# Patient Record
Sex: FEMALE | Race: WHITE | Employment: FULL TIME | URBAN - METROPOLITAN AREA
[De-identification: names, ages, dates, MRNs, and addresses within clinical notes are randomized per-mention and may not be internally consistent; named-entity substitution may affect disease eponyms.]

---

## 2017-01-16 PROCEDURE — 82784 ASSAY IGA/IGD/IGG/IGM EACH: CPT | Performed by: INTERNAL MEDICINE

## 2017-01-16 PROCEDURE — 86255 FLUORESCENT ANTIBODY SCREEN: CPT | Performed by: INTERNAL MEDICINE

## 2017-01-16 PROCEDURE — 83516 IMMUNOASSAY NONANTIBODY: CPT | Performed by: INTERNAL MEDICINE

## 2017-08-02 PROCEDURE — 87624 HPV HI-RISK TYP POOLED RSLT: CPT | Performed by: OBSTETRICS & GYNECOLOGY

## 2017-08-02 PROCEDURE — 88175 CYTOPATH C/V AUTO FLUID REDO: CPT | Performed by: OBSTETRICS & GYNECOLOGY

## 2017-09-05 PROBLEM — J45.20 MILD INTERMITTENT ASTHMA WITHOUT COMPLICATION: Status: ACTIVE | Noted: 2017-09-05

## 2017-09-05 PROBLEM — J45.20 MILD INTERMITTENT ASTHMA WITHOUT COMPLICATION (HCC): Status: ACTIVE | Noted: 2017-09-05

## 2018-01-19 PROCEDURE — 87086 URINE CULTURE/COLONY COUNT: CPT | Performed by: PHYSICIAN ASSISTANT

## 2018-01-19 PROCEDURE — 87077 CULTURE AEROBIC IDENTIFY: CPT | Performed by: PHYSICIAN ASSISTANT

## 2018-09-06 PROBLEM — K85.00 IDIOPATHIC ACUTE PANCREATITIS WITHOUT INFECTION OR NECROSIS (HCC): Status: ACTIVE | Noted: 2018-09-06

## 2018-09-06 PROBLEM — K85.00 IDIOPATHIC ACUTE PANCREATITIS WITHOUT INFECTION OR NECROSIS: Status: ACTIVE | Noted: 2018-09-06

## 2018-09-06 PROCEDURE — 88175 CYTOPATH C/V AUTO FLUID REDO: CPT | Performed by: OBSTETRICS & GYNECOLOGY

## 2018-09-06 PROCEDURE — 87624 HPV HI-RISK TYP POOLED RSLT: CPT | Performed by: OBSTETRICS & GYNECOLOGY

## 2019-05-07 PROBLEM — K85.00 IDIOPATHIC ACUTE PANCREATITIS WITHOUT INFECTION OR NECROSIS (HCC): Status: RESOLVED | Noted: 2018-09-06 | Resolved: 2019-05-07

## 2019-05-07 PROBLEM — K85.00 IDIOPATHIC ACUTE PANCREATITIS WITHOUT INFECTION OR NECROSIS: Status: RESOLVED | Noted: 2018-09-06 | Resolved: 2019-05-07

## 2019-05-07 PROBLEM — K86.1 AUTOIMMUNE PANCREATITIS (HCC): Status: ACTIVE | Noted: 2019-05-07

## 2019-06-12 ENCOUNTER — HOSPITAL ENCOUNTER (OUTPATIENT)
Age: 49
Discharge: HOME | End: 2019-06-12
Attending: FAMILY MEDICINE
Payer: COMMERCIAL

## 2019-06-12 VITALS
TEMPERATURE: 99 F | SYSTOLIC BLOOD PRESSURE: 122 MMHG | DIASTOLIC BLOOD PRESSURE: 60 MMHG | RESPIRATION RATE: 18 BRPM | WEIGHT: 150 LBS | HEART RATE: 72 BPM | OXYGEN SATURATION: 98 % | BODY MASS INDEX: 27 KG/M2

## 2019-06-12 DIAGNOSIS — L02.416 CUTANEOUS ABSCESS OF LEFT LOWER EXTREMITY: Primary | ICD-10-CM

## 2019-06-12 PROCEDURE — 99213 OFFICE O/P EST LOW 20 MIN: CPT

## 2019-06-12 PROCEDURE — 99203 OFFICE O/P NEW LOW 30 MIN: CPT

## 2019-06-12 RX ORDER — SULFAMETHOXAZOLE AND TRIMETHOPRIM 800; 160 MG/1; MG/1
1 TABLET ORAL 2 TIMES DAILY
Qty: 14 TABLET | Refills: 0 | Status: SHIPPED | OUTPATIENT
Start: 2019-06-12 | End: 2019-06-19

## 2019-06-12 NOTE — ED PROVIDER NOTES
Patient Seen in: [facility]    History   Patient presents with:  Abscess (integumentary)    Stated Complaint: lump by  groin area    HPI    Patient is here with a small boil over the left medial upper thigh.   For the past Temp 98.6 °F (37 °C) (Oral)   Resp 18   Wt 68 kg   LMP 05/01/2019 (Exact Date)   SpO2 98%   BMI 27.44 kg/m²         Physical Exam   Constitutional: She is oriented to person, place, and time. She appears well-developed and well-nourished. No distress.    H

## 2019-06-15 ENCOUNTER — HOSPITAL ENCOUNTER (OUTPATIENT)
Age: 49
Discharge: HOME | End: 2019-06-15
Payer: COMMERCIAL

## 2019-06-15 VITALS
HEIGHT: 66 IN | DIASTOLIC BLOOD PRESSURE: 67 MMHG | TEMPERATURE: 99 F | RESPIRATION RATE: 20 BRPM | BODY MASS INDEX: 24.11 KG/M2 | HEART RATE: 78 BPM | WEIGHT: 150 LBS | OXYGEN SATURATION: 100 % | SYSTOLIC BLOOD PRESSURE: 119 MMHG

## 2019-06-15 DIAGNOSIS — T78.40XA ALLERGIC REACTION, INITIAL ENCOUNTER: ICD-10-CM

## 2019-06-15 DIAGNOSIS — L02.91 ABSCESS: Primary | ICD-10-CM

## 2019-06-15 PROCEDURE — 99214 OFFICE O/P EST MOD 30 MIN: CPT

## 2019-06-15 PROCEDURE — 99213 OFFICE O/P EST LOW 20 MIN: CPT

## 2019-06-15 RX ORDER — CEPHALEXIN 500 MG/1
500 CAPSULE ORAL 3 TIMES DAILY
Qty: 21 CAPSULE | Refills: 0 | Status: SHIPPED | OUTPATIENT
Start: 2019-06-15 | End: 2019-06-21

## 2019-06-15 NOTE — ED INITIAL ASSESSMENT (HPI)
Seen here 3 days ago and started on bactrim to treat an abscess. Started with hives yesterday, worse today. C/o itching. No facial swelling or SOB.

## 2019-06-15 NOTE — ED PROVIDER NOTES
Patient presents with:  Rash Skin Problem (integumentary)      HPI:     Festus Foy is a 47year old female who presents today with a chief complaint of a rash that started yesterday.   The patient states she was on day 3 of taking Bactrim for a small on file        Gets together: Not on file        Attends Church service: Not on file        Active member of club or organization: Not on file        Attends meetings of clubs or organizations: Not on file        Relationship status: Not on file      In Encounter      cephALEXin 500 MG Oral Cap          Sig: Take 1 capsule (500 mg total) by mouth 3 (three) times daily for 7 days.           Dispense:  21 capsule          Refill:  0      Labs performed this visit:  No results found for this or any previous v

## 2020-11-09 ENCOUNTER — APPOINTMENT (OUTPATIENT)
Dept: ULTRASOUND IMAGING | Age: 50
End: 2020-11-09
Attending: EMERGENCY MEDICINE
Payer: COMMERCIAL

## 2020-11-09 ENCOUNTER — HOSPITAL ENCOUNTER (OUTPATIENT)
Age: 50
Discharge: HOME | End: 2020-11-09
Attending: EMERGENCY MEDICINE
Payer: COMMERCIAL

## 2020-11-09 VITALS
TEMPERATURE: 98 F | HEART RATE: 66 BPM | SYSTOLIC BLOOD PRESSURE: 133 MMHG | OXYGEN SATURATION: 100 % | DIASTOLIC BLOOD PRESSURE: 74 MMHG | RESPIRATION RATE: 18 BRPM

## 2020-11-09 DIAGNOSIS — T14.8XXA BLOOD BLISTER: ICD-10-CM

## 2020-11-09 DIAGNOSIS — S80.11XA CONTUSION OF RIGHT LOWER EXTREMITY, INITIAL ENCOUNTER: Primary | ICD-10-CM

## 2020-11-09 PROCEDURE — 99214 OFFICE O/P EST MOD 30 MIN: CPT

## 2020-11-09 PROCEDURE — 93971 EXTREMITY STUDY: CPT | Performed by: EMERGENCY MEDICINE

## 2020-11-09 NOTE — ED INITIAL ASSESSMENT (HPI)
PATIENT ARRIVED AMBULATORY TO ROOM. PATIENT STATES SHE INJURED HER RIGHT SHIN ON A SHOPPING CART 1 WEEK AGO. +SWELLING AND REDNESS TO THE AREA.  PATIENT STATES \"IT STILL HURTS AND ITS GETTING RED\"

## 2020-11-10 NOTE — ED PROVIDER NOTES
Patient Seen in: [facility]      History   Patient presents with:  Leg Pain    Stated Complaint: right leg pain swollen     HPI    44 yo female banged her right shin on a shopping cart about one week ago.  She c/o pain and a blister to the leslie Constitutional:       Appearance: Normal appearance. She is well-developed. HENT:      Head: Normocephalic and atraumatic. Eyes:      Conjunctiva/sclera: Conjunctivae normal.      Pupils: Pupils are equal, round, and reactive to light.    Neck:      M

## 2021-03-02 PROBLEM — Z86.16 HISTORY OF COVID-19: Status: ACTIVE | Noted: 2021-03-02

## 2021-03-02 PROBLEM — R94.31 PROLONGED Q-T INTERVAL ON ECG: Status: ACTIVE | Noted: 2021-03-02

## 2021-07-12 PROBLEM — R94.31 PROLONGED Q-T INTERVAL ON ECG: Status: RESOLVED | Noted: 2021-03-02 | Resolved: 2021-07-12

## 2022-02-01 PROBLEM — Z87.19 HISTORY OF PANCREATITIS: Status: ACTIVE | Noted: 2022-02-01

## 2024-03-02 ENCOUNTER — HOSPITAL ENCOUNTER (OUTPATIENT)
Age: 54
Discharge: HOME | End: 2024-03-02
Payer: COMMERCIAL

## 2024-03-02 VITALS
RESPIRATION RATE: 20 BRPM | HEART RATE: 96 BPM | DIASTOLIC BLOOD PRESSURE: 83 MMHG | OXYGEN SATURATION: 99 % | TEMPERATURE: 97 F | SYSTOLIC BLOOD PRESSURE: 136 MMHG

## 2024-03-02 DIAGNOSIS — N39.0 ACUTE UTI: Primary | ICD-10-CM

## 2024-03-02 LAB
CLARITY UR: CLEAR
COLOR UR: YELLOW
GLUCOSE UR STRIP-MCNC: NEGATIVE MG/DL
KETONES UR STRIP-MCNC: NEGATIVE MG/DL
NITRITE UR QL STRIP: NEGATIVE
PH UR STRIP: 7 [PH]
PROT UR STRIP-MCNC: 100 MG/DL
SP GR UR STRIP: 1.02
UROBILINOGEN UR STRIP-ACNC: 2 MG/DL

## 2024-03-02 PROCEDURE — 99204 OFFICE O/P NEW MOD 45 MIN: CPT

## 2024-03-02 PROCEDURE — 87086 URINE CULTURE/COLONY COUNT: CPT | Performed by: NURSE PRACTITIONER

## 2024-03-02 PROCEDURE — 81002 URINALYSIS NONAUTO W/O SCOPE: CPT

## 2024-03-02 RX ORDER — CEPHALEXIN 500 MG/1
500 CAPSULE ORAL 2 TIMES DAILY
Qty: 14 CAPSULE | Refills: 0 | Status: SHIPPED | OUTPATIENT
Start: 2024-03-02 | End: 2024-03-09

## 2024-03-02 NOTE — ED INITIAL ASSESSMENT (HPI)
Urinary symptoms since yesterday, no fever, no flank or back pain, no unusual vaginal bleeding or discharge

## 2024-03-02 NOTE — DISCHARGE INSTRUCTIONS
Push fluids. Rest. Take the antibiotics as prescribed. A urine culture is pending. Follow up as needed with your doctor. Return for any concerns.

## 2024-03-02 NOTE — ED PROVIDER NOTES
Patient Seen in: [facility]      History     Chief Complaint   Patient presents with    Urinary Symptoms     Stated Complaint: UTI  Subjective:   53-year-old female presents for dysuria, urgency, and frequency that started yesterday.  No beth hematuria.  No pelvic or abdominal pain.  No flank pain.  No fevers or chills.  No nausea or vomiting.  No vaginal concerns or risk for STDs.  She has had UTIs in the past.  She does have a history of kidney stones, but states this does not feel like a kidney stone.  No history of pyelonephritis.  She appears nontoxic.      Objective:   Past Medical History:   Diagnosis Date    Asthma (HCC)     + methacholine challenge test per patient    IBS (irritable bowel syndrome)     Kidney stone     MELINDA (obstructive sleep apnea) 6/29/21 HST    AHI 6 Supine AHI 13 non-supine AHI 3     Rheumatic fever in pediatric patient     Rosacea             Past Surgical History:   Procedure Laterality Date    COLONOSCOPY  2010    diverticulosis    COLONOSCOPY  12/16    normal- fair prep- repeat 5 yrs (MAG citrate+ prep)    COLONOSCOPY  08/2019    normal- rpeeat 10 yrs    OTHER  2010    R ovary & fallopian tube removed     OTHER  1988    cyst on L hand removed    OTHER SURGICAL HISTORY  2010    R oophorectomy    OTHER SURGICAL HISTORY      laparoscopy no findings    PELVIS LAPAROSCOPY,DX  June 2014              Social History     Socioeconomic History    Marital status:    Tobacco Use    Smoking status: Never    Smokeless tobacco: Never   Vaping Use    Vaping Use: Never used   Substance and Sexual Activity    Alcohol use: Yes     Alcohol/week: 0.0 standard drinks of alcohol    Drug use: No            Review of Systems   Genitourinary:  Positive for dysuria, frequency and urgency.   All other systems reviewed and are negative.      Positive for stated complaint: UTI  Other systems are as noted in HPI.  Constitutional and vital signs reviewed.      All other systems reviewed and  negative except as noted above.    Physical Exam     ED Triage Vitals [03/02/24 0809]   /83   Pulse 96   Resp 20   Temp 97 °F (36.1 °C)   Temp src Temporal   SpO2 99 %   O2 Device None (Room air)     Current:/83   Pulse 96   Temp 97 °F (36.1 °C) (Temporal)   Resp 20   LMP 09/02/2019   SpO2 99%     Physical Exam  Vitals and nursing note reviewed.   Constitutional:       General: She is not in acute distress.     Appearance: Normal appearance. She is not toxic-appearing.   Cardiovascular:      Rate and Rhythm: Normal rate and regular rhythm.   Pulmonary:      Effort: Pulmonary effort is normal.      Breath sounds: Normal breath sounds.   Abdominal:      Palpations: Abdomen is soft.      Tenderness: There is no abdominal tenderness. There is no right CVA tenderness or left CVA tenderness.   Musculoskeletal:         General: Normal range of motion.      Cervical back: Normal range of motion.   Skin:     General: Skin is warm and dry.      Capillary Refill: Capillary refill takes less than 2 seconds.   Neurological:      General: No focal deficit present.      Mental Status: She is alert and oriented to person, place, and time.   Psychiatric:         Mood and Affect: Mood normal.         Behavior: Behavior normal.         ED Course     Labs Reviewed   [facility] POCT URINALYSIS DIPSTICK - Abnormal; Notable for the following components:       Result Value    Protein urine 100 (*)     Bilirubin, Urine Small (*)     Blood, Urine Large (*)     Urobilinogen urine 2.0 (*)     Leukocyte esterase urine Large (*)     All other components within normal limits   URINE CULTURE, ROUTINE         MDM       Medical Decision Making  I will treat the UTI with Keflex.  We discussed pushing fluids.  She is aware culture is pending.  She will follow-up as needed with her primary care doctor.    Amount and/or Complexity of Data Reviewed  Labs: ordered.     Details: The urine dip shows large leukocytes, large blood, small bilirubin,  and 100 of protein.  A urine culture is pending.    Risk  Prescription drug management.  Risk Details: UTI versus pyelonephritis versus kidney stone        Disposition and Plan     Clinical Impression:  1. Acute UTI         Disposition:  Discharge  3/2/2024  8:24 am    Follow-up:  Enrique Griffith MD  [address] [address]  [phone]    Schedule an appointment as soon as possible for a visit   As needed          Medications Prescribed:  Discharge Medication List as of 3/2/2024  8:24 AM        START taking these medications    Details   cephalexin 500 MG Oral Cap Take 1 capsule (500 mg total) by mouth 2 (two) times daily for 7 days., Print, Disp-14 capsule, R-0

## 2025-01-06 ENCOUNTER — HOSPITAL ENCOUNTER (OUTPATIENT)
Age: 55
Discharge: HOME | End: 2025-01-06
Payer: COMMERCIAL

## 2025-01-06 VITALS
TEMPERATURE: 98 F | HEART RATE: 98 BPM | RESPIRATION RATE: 20 BRPM | DIASTOLIC BLOOD PRESSURE: 73 MMHG | SYSTOLIC BLOOD PRESSURE: 108 MMHG | OXYGEN SATURATION: 99 %

## 2025-01-06 DIAGNOSIS — J45.21 MILD INTERMITTENT ASTHMA WITH EXACERBATION (HCC): Primary | ICD-10-CM

## 2025-01-06 PROCEDURE — 99214 OFFICE O/P EST MOD 30 MIN: CPT

## 2025-01-06 PROCEDURE — 94640 AIRWAY INHALATION TREATMENT: CPT

## 2025-01-06 RX ORDER — AZITHROMYCIN 250 MG/1
TABLET, FILM COATED ORAL
Qty: 6 TABLET | Refills: 0 | Status: SHIPPED | OUTPATIENT
Start: 2025-01-06 | End: 2025-01-11

## 2025-01-06 RX ORDER — IPRATROPIUM BROMIDE AND ALBUTEROL SULFATE 2.5; .5 MG/3ML; MG/3ML
3 SOLUTION RESPIRATORY (INHALATION) ONCE
Status: COMPLETED | OUTPATIENT
Start: 2025-01-06 | End: 2025-01-06

## 2025-01-06 RX ORDER — METHYLPREDNISOLONE 4 MG/1
TABLET ORAL
Qty: 1 EACH | Refills: 0 | Status: SHIPPED | OUTPATIENT
Start: 2025-01-06

## 2025-01-06 NOTE — ED PROVIDER NOTES
Patient Seen in: [facility]      History     Chief Complaint   Patient presents with    Cough     Most likely Bronchitis (have had before feels the same  No voice - Entered by patient     Stated Complaint: Cough - Most likely Bronchitis (have had before feels the same  No voice    Subjective:   HPI    54-year-old female with history of asthma, MELINDA presents for evaluation of cough, chest congestion and voice hoarseness.  Patient reports symptoms for the last 3 days.  She has been using her albuterol inhaler and Singulair, Advair but continues to have some shortness of breath.  She states it feels like previous episodes of bronchitis flares.    Objective:     Past Medical History:    Asthma (HCC)    + methacholine challenge test per patient    IBS (irritable bowel syndrome)    Kidney stone    MELINDA (obstructive sleep apnea)    AHI 6 Supine AHI 13 non-supine AHI 3     Rheumatic fever in pediatric patient    Rosacea              Past Surgical History:   Procedure Laterality Date    Colonoscopy  2010    diverticulosis    Colonoscopy  12/16    normal- fair prep- repeat 5 yrs (MAG citrate+ prep)    Colonoscopy  08/2019    normal- rpeeat 10 yrs    Other  2010    R ovary & fallopian tube removed     Other  1988    cyst on L hand removed    Other surgical history  2010    R oophorectomy    Other surgical history      laparoscopy no findings    Pelvis laparoscopy,dx  June 2014                No pertinent social history.            Review of Systems    Positive for stated complaint: Cough - Most likely Bronchitis (have had before feels the same  No voice  Other systems are as noted in HPI.  Constitutional and vital signs reviewed.      All other systems reviewed and negative except as noted above.    Physical Exam     ED Triage Vitals [01/06/25 0958]   /73   Pulse 118   Resp 20   Temp 98.2 °F (36.8 °C)   Temp src Temporal   SpO2 99 %   O2 Device None (Room air)       Current Vitals:   Vital Signs  BP:  108/73  Pulse: 98  Resp: 20  Temp: 98.2 °F (36.8 °C)  Temp src: Temporal    Oxygen Therapy  SpO2: 99 %  O2 Device: None (Room air)        Physical Exam  Vitals and nursing note reviewed.   Constitutional:       General: She is not in acute distress.  HENT:      Head: Normocephalic and atraumatic.      Right Ear: External ear normal.      Left Ear: External ear normal.      Nose: Nose normal.      Mouth/Throat:      Mouth: Mucous membranes are moist.   Eyes:      Extraocular Movements: Extraocular movements intact.      Pupils: Pupils are equal, round, and reactive to light.   Cardiovascular:      Rate and Rhythm: Normal rate.   Pulmonary:      Effort: Pulmonary effort is normal.      Breath sounds: Decreased breath sounds present.   Abdominal:      General: Abdomen is flat.   Musculoskeletal:         General: Normal range of motion.      Cervical back: Normal range of motion.   Skin:     General: Skin is warm.   Neurological:      General: No focal deficit present.      Mental Status: She is alert and oriented to person, place, and time.   Psychiatric:         Mood and Affect: Mood normal.         Behavior: Behavior normal.             ED Course   Labs Reviewed - No data to display      53 yo female here with c/o cough, laryngitis, shortness of breath.  Patient feels like these symptoms are suggestive of asthma exacerbation.  Her oxygen saturation is 99%, no wheezing on auscultation    Ddx-asthma exacerbation, bronchitis, pneumonia    After a DuoNeb treatment patient with improved air movement.  There is no wheezing and patient's heart rate improved.  She states she has been monitoring her oxygen at home and it has been 97% or greater  She has a primary care appointment in 2 days and pulmonology appointment next week.  She states she has previously responded Medrol Dosepak and azithromycin.  I will Rx the same with strict ER return precautions advised for any worsening shortness of breath, chest congestion.            MDM              Medical Decision Making      Disposition and Plan     Clinical Impression:  1. Mild intermittent asthma with exacerbation (HCC)         Disposition:  Discharge  1/6/2025 11:32 am    Follow-up:  Enrique Griffith MD  [address] [address]  [phone]      go to as scheduled on 1/8/2025          Medications Prescribed:  Discharge Medication List as of 1/6/2025 11:33 AM        START taking these medications    Details   methylPREDNISolone (MEDROL) 4 MG Oral Tablet Therapy Pack Dosepack: take as directed, Normal, Disp-1 each, R-0Pt tolerates Medrol      azithromycin (ZITHROMAX Z-BREEZY) 250 MG Oral Tab 500 mg once followed by 250 mg daily x 4 days, Normal, Disp-6 tablet, R-0                 Supplementary Documentation: